# Patient Record
Sex: FEMALE | Race: OTHER | Employment: UNEMPLOYED | URBAN - NONMETROPOLITAN AREA
[De-identification: names, ages, dates, MRNs, and addresses within clinical notes are randomized per-mention and may not be internally consistent; named-entity substitution may affect disease eponyms.]

---

## 2021-08-19 ENCOUNTER — OFFICE VISIT (OUTPATIENT)
Dept: OBGYN CLINIC | Age: 41
End: 2021-08-19

## 2021-08-19 ENCOUNTER — TELEPHONE (OUTPATIENT)
Dept: OBGYN CLINIC | Age: 41
End: 2021-08-19

## 2021-08-19 VITALS
SYSTOLIC BLOOD PRESSURE: 150 MMHG | WEIGHT: 163.4 LBS | HEIGHT: 61 IN | BODY MASS INDEX: 30.85 KG/M2 | DIASTOLIC BLOOD PRESSURE: 102 MMHG

## 2021-08-19 DIAGNOSIS — O10.919: Primary | ICD-10-CM

## 2021-08-19 DIAGNOSIS — N91.1 AMENORRHEA, SECONDARY: Primary | ICD-10-CM

## 2021-08-19 PROCEDURE — 99213 OFFICE O/P EST LOW 20 MIN: CPT | Performed by: ADVANCED PRACTICE MIDWIFE

## 2021-08-19 RX ORDER — LABETALOL 200 MG/1
400 TABLET, FILM COATED ORAL 2 TIMES DAILY
COMMUNITY

## 2021-08-19 ASSESSMENT — ENCOUNTER SYMPTOMS: GASTROINTESTINAL NEGATIVE: 1

## 2021-08-19 NOTE — TELEPHONE ENCOUNTER
Please get recent ER records from [facility] - patient reported has been there twice.     Also delivery note from 4/2020 from [facility] in [facility] please

## 2021-08-19 NOTE — PROGRESS NOTES
PROBLEM VISIT     Date of service: 2021    Ethan Lombardo  Is a 42 y.o.  female    PT's PCP is: No primary care provider on file. : 1980                                          Review of Systems   Constitutional: Negative. HENT: Negative. Increased saliva   Cardiovascular: Negative. Gastrointestinal: Negative. Genitourinary: Positive for menstrual problem (LMP in May - positive HPT) and vaginal bleeding (Postcoital spotting). Neurological: Negative. \"my neck hurts sometimes\"   Psychiatric/Behavioral: Negative. Patient's last menstrual period was 2021 (exact date). OB History    Para Term  AB Living   4 4 3 1   4   SAB TAB Ectopic Molar Multiple Live Births             4      # Outcome Date GA Lbr Alex/2nd Weight Sex Delivery Anes PTL Lv   4  04/15/20 30w0d   F CS-Unspec   DARIA   3 Term 16 40w0d   F Vag-Spont   DARIA   2 Term 04 40w0d   F Vag-Spont   DARIA   1 Term 99 40w0d   F Vag-Spont   DARIA        Social History     Tobacco Use   Smoking Status Never Smoker   Smokeless Tobacco Never Used        Social History     Substance and Sexual Activity   Alcohol Use Not Currently       Allergies: Patient has no known allergies. Current Outpatient Medications:     Prenatal MV-Min-Fe Fum-FA-DHA (PRENATAL 1 PO), Take by mouth, Disp: , Rfl:     labetalol (NORMODYNE) 200 MG tablet, Take 200 mg by mouth 2 times daily, Disp: , Rfl:     Social History     Substance and Sexual Activity   Sexual Activity Not on file       Chief Complaint   Patient presents with    Amenorrhea     Pt c/o lots of salvia. Pt has previous  and incison hurts. Pt has hx high BP. Physical Exam  Constitutional:       Appearance: Normal appearance. HENT:      Head: Normocephalic. Eyes:      Pupils: Pupils are equal, round, and reactive to light. Cardiovascular:      Rate and Rhythm: Normal rate and regular rhythm.       Pulses: Normal pulses. Heart sounds: Normal heart sounds. No murmur heard. Pulmonary:      Effort: Pulmonary effort is normal.      Breath sounds: Normal breath sounds. No wheezing. Abdominal:      Palpations: Abdomen is soft. Tenderness: There is no abdominal tenderness. There is no guarding or rebound. Comments: Doppler attempted - FHT's 143   Musculoskeletal:         General: Normal range of motion. Cervical back: Normal range of motion. Neurological:      Mental Status: She is alert and oriented to person, place, and time. Skin:     General: Skin is warm and dry. Psychiatric:         Behavior: Behavior normal.   Vitals and nursing note reviewed. Chaperone present: Here with children. Vital Signs  Blood pressure (!) 150/102, height 5' 1\" (1.549 m), weight 163 lb 6.4 oz (74.1 kg), last menstrual period 05/17/2021. HPI New patient to office. Here to establish care. Has been to ER at [facility] - will request records - for HTN - prescribed Labetalol 200mg PO BID but is only taking it once daily. Taking PNV - script from ER. Last pregnancy was delivered in [facility] by c/s due to severe range BP. FOB reported to be healthy but patient does not want him involved. Assessment and Plan          Diagnosis Orders   1. Amenorrhea, secondary         Discussed timing of viability USN, PNI, NOB. Encouraged to start bASA now for HTn. Will consult cardiology due to BP control and poorly controlled CHTN. Discussed that due to [facility] will need wkly nst/bpp at 32 weeks, del at 37 weeks via repeat c/s - she understood. Will get ER records and op note from c/s in [place]. Encouraged to take another Labetalol tablet while in office. I am having Dominik Iqbal maintain her Prenatal MV-Min-Fe Fum-FA-DHA (PRENATAL 1 PO) and labetalol. No follow-ups on file.     She was also counseled on her preventative health maintenance recommendations and follow-up. There are no Patient Instructions on file for this visit.     [address] [place] [address] 12:48 PM                   Electronically signed by KOKI Jones CNM

## 2021-08-23 ENCOUNTER — TELEPHONE (OUTPATIENT)
Dept: OBGYN CLINIC | Age: 41
End: 2021-08-23

## 2021-08-23 NOTE — TELEPHONE ENCOUNTER
Reviewed ER records    Did have viability USN 7/17/21 showing viable IUP 9 1/7 weeks gestation with MAMI 2/18/2022. Fibroid 4x4.8cm  Left ovarian cyst 2.7 and 3.2cm? Repeat viability scheduled to recheck all of these measurements 8/25. Has PNI same day. No NOB is scheduled - please get this scheduled!! Shes late to care and very high risk.       Records from ER are in your scan pile

## 2021-08-25 ENCOUNTER — INITIAL PRENATAL (OUTPATIENT)
Dept: OBGYN CLINIC | Age: 41
End: 2021-08-25

## 2021-08-25 ENCOUNTER — HOSPITAL ENCOUNTER (OUTPATIENT)
Age: 41
Setting detail: SPECIMEN
Discharge: HOME | End: 2021-08-25

## 2021-08-25 ENCOUNTER — TELEPHONE (OUTPATIENT)
Dept: OBGYN | Age: 41
End: 2021-08-25

## 2021-08-25 VITALS
BODY MASS INDEX: 31.19 KG/M2 | SYSTOLIC BLOOD PRESSURE: 162 MMHG | HEIGHT: 61 IN | DIASTOLIC BLOOD PRESSURE: 102 MMHG | WEIGHT: 165.2 LBS

## 2021-08-25 DIAGNOSIS — O99.210 OBESITY IN PREGNANCY: ICD-10-CM

## 2021-08-25 DIAGNOSIS — Z34.90 PREGNANCY, UNSPECIFIED GESTATIONAL AGE: ICD-10-CM

## 2021-08-25 DIAGNOSIS — O10.919 CHRONIC HYPERTENSION IN PREGNANCY: ICD-10-CM

## 2021-08-25 DIAGNOSIS — Z34.90 PREGNANCY, UNSPECIFIED GESTATIONAL AGE: Primary | ICD-10-CM

## 2021-08-25 LAB
-: NORMAL
ABO/RH: NORMAL
ABSOLUTE EOS #: 0 K/UL (ref 0–0.4)
ABSOLUTE IMMATURE GRANULOCYTE: 0 K/UL (ref 0–0.3)
ABSOLUTE LYMPH #: 1.19 K/UL (ref 1–4.8)
ABSOLUTE MONO #: 0.59 K/UL (ref 0.1–0.8)
ALBUMIN SERPL-MCNC: 3.8 G/DL (ref 3.5–5.2)
ALBUMIN/GLOBULIN RATIO: 1.1 (ref 1–2.5)
ALP BLD-CCNC: 63 U/L (ref 35–104)
ALT SERPL-CCNC: 17 U/L (ref 5–33)
AMORPHOUS: NORMAL
AMPHETAMINE SCREEN URINE: NEGATIVE
ANION GAP SERPL CALCULATED.3IONS-SCNC: 17 MMOL/L (ref 9–17)
ANTIBODY SCREEN: NEGATIVE
AST SERPL-CCNC: 21 U/L
BACTERIA: NORMAL
BARBITURATE SCREEN URINE: NEGATIVE
BASOPHILS # BLD: 0 % (ref 0–2)
BASOPHILS ABSOLUTE: 0 K/UL (ref 0–0.2)
BENZODIAZEPINE SCREEN, URINE: NEGATIVE
BILIRUB SERPL-MCNC: <0.1 MG/DL (ref 0.3–1.2)
BILIRUBIN URINE: NEGATIVE
BUN BLDV-MCNC: 22 MG/DL (ref 6–20)
BUN/CREAT BLD: ABNORMAL (ref 9–20)
BUPRENORPHINE URINE: NORMAL
CALCIUM SERPL-MCNC: 9.8 MG/DL (ref 8.6–10.4)
CANNABINOID SCREEN URINE: NEGATIVE
CASTS UA: NORMAL /LPF (ref 0–8)
CHLORIDE BLD-SCNC: 101 MMOL/L (ref 98–107)
CO2: 19 MMOL/L (ref 20–31)
COCAINE METABOLITE, URINE: NEGATIVE
COLOR: YELLOW
COMMENT UA: ABNORMAL
CREAT SERPL-MCNC: 0.91 MG/DL (ref 0.5–0.9)
CREATININE URINE: 140.7 MG/DL (ref 28–217)
CRYSTALS, UA: NORMAL /HPF
DIFFERENTIAL TYPE: ABNORMAL
EOSINOPHILS RELATIVE PERCENT: 0 % (ref 1–4)
EPITHELIAL CELLS UA: NORMAL /HPF (ref 0–5)
GFR AFRICAN AMERICAN: >60 ML/MIN
GFR NON-AFRICAN AMERICAN: >60 ML/MIN
GFR SERPL CREATININE-BSD FRML MDRD: ABNORMAL ML/MIN/{1.73_M2}
GFR SERPL CREATININE-BSD FRML MDRD: ABNORMAL ML/MIN/{1.73_M2}
GLUCOSE BLD-MCNC: 89 MG/DL (ref 70–99)
GLUCOSE URINE: NEGATIVE
HCT VFR BLD CALC: 35.6 % (ref 36.3–47.1)
HEMOGLOBIN: 10.6 G/DL (ref 11.9–15.1)
HEPATITIS B SURFACE ANTIGEN: NONREACTIVE
HEPATITIS C ANTIBODY: NONREACTIVE
HIV AG/AB: REACTIVE
IMMATURE GRANULOCYTES: 0 %
KETONES, URINE: NEGATIVE
LEUKOCYTE ESTERASE, URINE: NEGATIVE
LYMPHOCYTES # BLD: 18 % (ref 24–44)
MCH RBC QN AUTO: 23.8 PG (ref 25.2–33.5)
MCHC RBC AUTO-ENTMCNC: 29.8 G/DL (ref 28.4–34.8)
MCV RBC AUTO: 79.8 FL (ref 82.6–102.9)
MDMA URINE: NORMAL
METHADONE SCREEN, URINE: NEGATIVE
METHAMPHETAMINE, URINE: NORMAL
MONOCYTES # BLD: 9 % (ref 1–7)
MORPHOLOGY: ABNORMAL
MORPHOLOGY: ABNORMAL
MUCUS: NORMAL
NITRITE, URINE: NEGATIVE
NRBC AUTOMATED: 0 PER 100 WBC
OPIATES, URINE: NEGATIVE
OTHER OBSERVATIONS UA: NORMAL
OXYCODONE SCREEN URINE: NEGATIVE
PDW BLD-RTO: 18.6 % (ref 11.8–14.4)
PH UA: 5 (ref 5–8)
PHENCYCLIDINE, URINE: NEGATIVE
PLATELET # BLD: ABNORMAL K/UL (ref 138–453)
PLATELET ESTIMATE: ABNORMAL
PLATELET, FLUORESCENCE: 169 K/UL (ref 138–453)
PLATELET, IMMATURE FRACTION: 7.2 % (ref 1.1–10.3)
PMV BLD AUTO: ABNORMAL FL (ref 8.1–13.5)
POTASSIUM SERPL-SCNC: 4 MMOL/L (ref 3.7–5.3)
PROPOXYPHENE, URINE: NORMAL
PROTEIN UA: ABNORMAL
RBC # BLD: 4.46 M/UL (ref 3.95–5.11)
RBC # BLD: ABNORMAL 10*6/UL
RBC UA: NORMAL /HPF (ref 0–4)
RENAL EPITHELIAL, UA: NORMAL /HPF
RUBV IGG SER QL: >500 IU/ML
SEG NEUTROPHILS: 73 % (ref 36–66)
SEGMENTED NEUTROPHILS ABSOLUTE COUNT: 4.82 K/UL (ref 1.8–7.7)
SODIUM BLD-SCNC: 137 MMOL/L (ref 135–144)
SPECIFIC GRAVITY UA: 1.02 (ref 1–1.03)
T. PALLIDUM, IGG: NONREACTIVE
TEST INFORMATION: NORMAL
TOTAL PROTEIN, URINE: 82 MG/DL
TOTAL PROTEIN: 7.3 G/DL (ref 6.4–8.3)
TRICHOMONAS: NORMAL
TRICYCLIC ANTIDEPRESSANTS, UR: NORMAL
TURBIDITY: CLEAR
URIC ACID: 5.3 MG/DL (ref 2.4–5.7)
URINE HGB: NEGATIVE
URINE TOTAL PROTEIN CREATININE RATIO: 0.58 (ref 0–0.2)
UROBILINOGEN, URINE: NORMAL
WBC # BLD: 6.6 K/UL (ref 3.5–11.3)
WBC # BLD: ABNORMAL 10*3/UL
WBC UA: NORMAL /HPF (ref 0–5)
YEAST: NORMAL

## 2021-08-25 PROCEDURE — 0500F INITIAL PRENATAL CARE VISIT: CPT | Performed by: ADVANCED PRACTICE MIDWIFE

## 2021-08-25 RX ORDER — ASPIRIN 81 MG/1
81 TABLET, CHEWABLE ORAL DAILY
COMMUNITY

## 2021-08-25 NOTE — PROGRESS NOTES
Here for PNI with daughter as  following USN. BP's elevated. Dr Moreno Pun aware and pt to increase Labetalol to 400 mg BID and recheck next week. Instructions written down for pts daughter on med increase. Baseline PIH, random glucose, uric acid, CMP and protein/creatinine done with . Unable to do early 1hr gtt d/t gestation of 14 weeks so labs drawn. Hx c/s with last pregnancy with  delivery d/t elevated BP's. Pt will return next week for NOB and BP check.

## 2021-08-25 NOTE — PATIENT INSTRUCTIONS
Patient Education        Learning About Starting to Breastfeed  Planning ahead     Before your baby is born, plan ahead. Learn all you can about breastfeeding. This helps make breastfeeding easier. · Early in your pregnancy, talk to your doctor or midwife about breastfeeding. · Learn the basics before your baby is born. The staff at hospitals and birthing centers can help you find a lactation specialist. This person is often a nurse who has been trained to teach and advise about breastfeeding. Or you can take a breastfeeding class. · Plan ahead for times when you will need help after your baby is born. You may want to get help from friends and family. You can also join a support group to talk to others who breastfeed. · Buy the equipment you'll need. Examples are breast pads, nipple cream, extra pillows, and nursing bras. Find out about breast pumps too. Getting help from your hospital or birthing center  It's important to have support from the doctors, nurses, and hospital staff who care for you and your baby. Before it's time for you to give birth, ask about the breastfeeding policies at your hospital or birthing center. Look for a hospital or birthing center that has policies for:  · \"Rooming in. \" This policy encourages you to have your baby in the room with you. It can allow you to breastfeed more often. · Supplemental feedings. Tell the staff that your baby is to get only your breast milk from birth. If staff feed your baby water, sugar solution, or formula right after birth without a medical reason, it may make it harder for you to breastfeed. · Pacifiers or artificial nipples. Staff should not give your  these items. They may interfere with breastfeeding. · Follow-up. Find out if your hospital can help you with breastfeeding issues after you go home. See if you can get information on support groups or other contacts.  They might help if you need help setting up and staying with your breastfeeding routine. Your first feeding  It's best to start breastfeeding within 1 hour of birth. For each feeding, you go through these basic steps:  · Get ready for the feeding. Be calm and relaxed, and try not to be distracted. Get some water or juice for yourself. Use two or three pillows to help support your baby while nursing. · Find a breastfeeding position that is comfortable for you and your baby. Examples are the cradle and the football positions. Make sure the baby's head and chest are lined up straight and facing your breast. It's best to switch which breast you start with each time. · Get the baby latched on well. Your baby's mouth needs to be wide open, like a yawn. So you may need to gently touch the middle of your baby's lower lip. When your baby's mouth is open wide, quickly bring the baby onto your nipple and areola. The areola is the dark [place] around your nipple. · Provide a complete feeding. Let your baby decide how long to nurse. Be sure to burp your baby after each breast.  In the first days after birth, your breasts make a thick, yellow liquid called colostrum. This liquid gives your baby nutrients and antibodies against infection. It is all that babies need at first. Your breasts will fill with milk a few days after the birth. Talk to your doctor, midwife, or lactation specialist right away if you are having problems and aren't sure what to do. How often to breastfeed  Plan to breastfeed your baby on demand rather than setting a strict schedule. For the first 2 weeks, be prepared to breastfeed at least 8 times in a 24-hour period. In the first few days, you may need to wake a sleeping baby to feed. If you breastfeed more often, it will help your breasts to produce more milk. After you go home  After you're home, don't be afraid to call your doctor, midwife, or lactation specialist with questions. That's true even if you don't know what's bothering you.  They are used to parents of newborns calling. They can help you figure out if there is a problem, and if so, how to fix it. Plan for times when you will be apart from your baby. Use a breast pump to collect breast milk ahead of time. You can store milk in the refrigerator or freezer. Then it's ready when someone else will be taking care of your baby. Experts recommend waiting about a month until breastfeeding is going well before offering a bottle. Breastfeeding is a learned skill that gets easier over time. You are more likely to succeed if you plan ahead, learn the basic techniques, and know where to get help and support. Where can you learn more? Go to https://mydecopepiceweb.Canary. org and sign in to your BabbaCo (acquired by [employer] in 2014) account. Enter D898 in the Blizuu box to learn more about \"Learning About Starting to Breastfeed. \"     If you do not have an account, please click on the \"Sign Up Now\" link. Current as of: October 8, 2020               Content Version: 12.9  © 2006-2021 SimpleOrder. Care instructions adapted under license by [facility] ([facility]). If you have questions about a medical condition or this instruction, always ask your healthcare professional. [place] [address] any warranty or liability for your use of this information. Patient Education        Nutrition During Pregnancy: Care Instructions  Your Care Instructions     Healthy eating when you are pregnant is important for you and your baby. It can help you feel well and have a successful pregnancy and delivery. During pregnancy your nutrition needs increase. Even if you have excellent eating habits, your doctor may recommend a multivitamin to make sure you get enough iron and folic acid. Many pregnant women wonder how much weight they should gain. In general, women who were at a healthy weight before they became pregnant should gain between 25 and 35 pounds.  Women who were overweight before pregnancy are usually advised to gain 15 to 25 pounds. Women who were underweight before pregnancy are usually advised to gain 28 to 40 pounds. Your doctor will work with you to set a weight goal that is right for you. Gaining a healthy amount of weight helps you have a healthy baby. Follow-up care is a key part of your treatment and safety. Be sure to make and go to all appointments, and call your doctor if you are having problems. It's also a good idea to know your test results and keep a list of the medicines you take. How can you care for yourself at home? · Eat plenty of fruits and vegetables. Include a variety of orange, yellow, and leafy dark-green vegetables every day. · Choose whole-grain bread, cereal, and pasta. Good choices include whole wheat bread, whole wheat pasta, brown rice, and oatmeal.  · Get 4 or more servings of milk and milk products each day. Good choices include nonfat or low-fat milk, yogurt, and cheese. If you cannot eat milk products, you can get calcium from calcium-fortified products such as orange juice, soy milk, and tofu. Other non-milk sources of calcium include leafy green vegetables, such as broccoli, kale, mustard greens, turnip greens, bok raudel, and brussels sprouts. · If you eat meat, pick lower-fat types. Good choices include lean cuts of meat and chicken or turkey without the skin. · Do not eat shark, swordfish, deepali mackerel, or tilefish. They have high levels of mercury, which is dangerous to your baby. You can eat up to 12 ounces a week of fish or shellfish that have low mercury levels. Good choices include shrimp, wild salmon, pollock, and catfish. Limit some other types of fish, such as white (albacore) tuna, to 4 oz (0.1 kg) a week. · Heat lunch meats (such as turkey, ham, or bologna) to 165°F before you eat them. This reduces your risk of getting sick from a kind of bacteria that can be found in lunch meats.   · Do not eat unpasteurized soft cheeses, such as brie, feta, fresh mozzarella, and blue cheese. They have a bacteria that could harm your baby. · Limit caffeine. If you drink coffee or tea, have no more than 1 cup a day. Caffeine is also found in bridget. · Do not drink any alcohol. No amount of alcohol has been found to be safe during pregnancy. · Do not diet or try to lose weight. For example, do not follow a low-carbohydrate diet. If you are overweight at the start of your pregnancy, your doctor will work with you to manage your weight gain. · Tell your doctor about all vitamins and supplements you take. When should you call for help? Watch closely for changes in your health, and be sure to contact your doctor if you have any problems. Where can you learn more? Go to https://Paraytecpepiceweb.iList. org and sign in to your eFans account. Enter Y785 in the SomaLogic box to learn more about \"Nutrition During Pregnancy: Care Instructions. \"     If you do not have an account, please click on the \"Sign Up Now\" link. Current as of: October 8, 2020               Content Version: 12.9  © 2006-2021 J C Lads. Care instructions adapted under license by [facility] ([facility]). If you have questions about a medical condition or this instruction, always ask your healthcare professional. [place] [address] any warranty or liability for your use of this information. Patient Education        Weeks 14 to 25 of Your Pregnancy: Care Instructions  Your Care Instructions     During this time, you may start to \"show,\" so that you look pregnant to people around you. You may also notice some changes in your skin, such as itchy spots on your palms or acne on your face. Your baby is now able to pass urine, and your baby's first stool (meconium) is starting to collect in his or her intestines. Hair is also beginning to grow on your baby's head.   At your next visit, between weeks 18 and 20, your doctor may do an ultrasound test. The test allows your doctor to check for certain problems. Your doctor can also tell the sex of your baby. This is a good time to think about whether you want to know whether your baby is a boy or a girl. Talk to your doctor about getting a flu shot to help keep you healthy during your pregnancy. As your pregnancy moves along, it is common to worry or feel anxious. Your body is changing a lot. And you are thinking about giving birth, the health of your baby, and becoming a parent. You can learn to cope with any anxiety and stress you feel. Follow-up care is a key part of your treatment and safety. Be sure to make and go to all appointments, and call your doctor if you are having problems. It's also a good idea to know your test results and keep a list of the medicines you take. How can you care for yourself at home? Reduce stress    · Ask for help with cooking and housekeeping.     · Figure out who or what causes your stress. Avoid these people or situations as much as possible.     · Relax every day. Taking 10- to 15-minute breaks can make a big difference. Take a walk, listen to music, or take a warm bath.     · Learn relaxation techniques at prenatal or yoga class. Or buy a relaxation tape.     · List your fears about having a baby and becoming a parent. Share the list with someone you trust. Decide which worries are really small, and try to let them go. Exercise    · If you did not exercise much before pregnancy, start slowly. Walking is best. [employer], and do a little more every day.     · Brisk walking, easy jogging, low-impact aerobics, water aerobics, and yoga are good choices. Some sports, such as scuba diving, horseback riding, downhill skiing, gymnastics, and water skiing, are not a good idea.     · Try to do at least 2½ hours a week of moderate exercise, such as a fast walk. One way to do this is to be active 30 minutes a day, at least 5 days a week.     · Wear loose clothing.  And wear shoes and a bra that provide good support.     · Warm up and cool down to start and finish your exercise.     · If you want to use weights, be sure to use light weights. They reduce stress on your joints. Stay at the best weight for you    · Experts recommend that you gain about 1 pound a month during the first 3 months of your pregnancy.     · Experts recommend that you gain about 1 pound a week during your last 6 months of pregnancy, for a total weight gain of 25 to 35 pounds.     · If you are underweight, you will need to gain more weight (about 28 to 40 pounds).     · If you are overweight, you may not need to gain as much weight (about 15 to 25 pounds).     · If you are gaining weight too fast, use common sense. Exercise every day, and limit sweets, fast foods, and fats. Choose lean meats, fruits, and vegetables.     · If you are having twins or more, your doctor may refer you to a dietitian. Where can you learn more? Go to https://SegONE Inc.pePryvskyler.healthEncore Alert. org and sign in to your Quote Roller account. Enter X412 in the Therative box to learn more about \"Weeks 14 to 18 of Your Pregnancy: Care Instructions. \"     If you do not have an account, please click on the \"Sign Up Now\" link. Current as of: October 8, 2020               Content Version: 12.9  © 2477-8260 Healthwise, Incorporated. Care instructions adapted under license by [facility] ([facility]). If you have questions about a medical condition or this instruction, always ask your healthcare professional. [place] [address] any warranty or liability for your use of this information.

## 2021-08-26 LAB
CULTURE: NO GROWTH
ESTIMATED AVERAGE GLUCOSE: 103 MG/DL
HBA1C MFR BLD: 5.2 % (ref 4–6)
HIV INTERPRETATION: ABNORMAL
HIV-1 ANTIBODY: POSITIVE
HIV-2 AB: NEGATIVE
Lab: NORMAL
SPECIMEN DESCRIPTION: NORMAL

## 2021-08-26 NOTE — TELEPHONE ENCOUNTER
Spoke with Dr. Heide Hawkins office and they moved her up to September 30th and said they would be her on the wait list and if anything earlier opens up they will call.

## 2021-08-27 LAB — FRUCTOSAMINE: 227 UMOL/L (ref 170–285)

## 2021-08-30 ENCOUNTER — TELEPHONE (OUTPATIENT)
Dept: OBGYN CLINIC | Age: 41
End: 2021-08-30

## 2021-08-30 NOTE — TELEPHONE ENCOUNTER
I received a voicemail from Dwayne nolen at [facility] Department that patient had a reactive HIV. She is questioning about follow up testing, etc.  It does not appear that [facility] has reviewed the results yet. She is requesting a return call at [phone] ext [phone] with follow up recommendations/testing being ordered.

## 2021-08-31 ENCOUNTER — TELEPHONE (OUTPATIENT)
Dept: OBGYN CLINIC | Age: 41
End: 2021-08-31

## 2021-08-31 NOTE — TELEPHONE ENCOUNTER
Dr Lew Blanco - please review    AMA, very poorly controlled HTN - was in CCU at [address] recently, started on Labetalol and meds adjusted per you when she was in for PNI. Her pr/cr ratio is 0.58 and slightly elevated BUN/Creat - would you like to consider seeing nephrology in pregnancy due to likely kidney damage also from [facility]. Also now positive HIV - possibly new dx.

## 2021-08-31 NOTE — TELEPHONE ENCOUNTER
Please call health dept with all results - HIV antibody testing was done and is resulted.   We will discuss with patient today at her visit - did not report this hx to us

## 2021-09-01 ENCOUNTER — TELEPHONE (OUTPATIENT)
Dept: OBGYN | Age: 41
End: 2021-09-01

## 2021-09-01 RX ORDER — FERROUS SULFATE 325(65) MG
325 TABLET ORAL DAILY
Qty: 30 TABLET | Refills: 6 | Status: SHIPPED | OUTPATIENT
Start: 2021-09-01

## 2021-09-01 NOTE — TELEPHONE ENCOUNTER
Thanks - I am aware needs to be referred. I noted this in message to Alf since she is seeing patient for  NOB that was rescheduled. Can you try to reach patient with results - then have Heather Ortez refer to Dr Rajni Martinez for additional testing and treatment.

## 2021-09-01 NOTE — TELEPHONE ENCOUNTER
Spoke with patient's daughter and made her aware of anemia and positive HIV status. She stated patient did not know she had HIV. Advised of additional testing needed and referral to infectious disease.

## 2021-09-01 NOTE — TELEPHONE ENCOUNTER
Looks like you are now seeing her for her NOB - she no showed yesterday. Can you make sure she is aware of cardiology appt. Also her NOB labs ws positive for HIV - had not reported this to use so need additional details to determine if new onset and refer to ID for mgmt and f/u after delivery.

## 2021-09-01 NOTE — TELEPHONE ENCOUNTER
Can you please try again to get records from hospital in [place] - I want her prenatal labs (determine if was HIV at that time) and delivery information.     Thanks

## 2021-09-01 NOTE — TELEPHONE ENCOUNTER
[facility] Department aware of results. I spoke with Ar Hernandes and she states patient needs to be referred to infectious disease ASAP and started on medication. Pt also needs additional testing done, confirmatory test. She states patient can see Dr. Deedee Garces or there is a place in [place] called [facility] that helps pregnant women who have HIV. Stephanie would like a call back with a plan for this patient.

## 2021-09-07 NOTE — TELEPHONE ENCOUNTER
She needs to consider complete transfer of care to tertiary center if she is that sick - might be able to comanage with [facility]fm to save her some driving

## 2021-09-15 ENCOUNTER — INITIAL PRENATAL (OUTPATIENT)
Dept: OBGYN CLINIC | Age: 41
End: 2021-09-15

## 2021-09-15 ENCOUNTER — HOSPITAL ENCOUNTER (OUTPATIENT)
Age: 41
Setting detail: SPECIMEN
Discharge: HOME | End: 2021-09-15

## 2021-09-15 VITALS
SYSTOLIC BLOOD PRESSURE: 180 MMHG | DIASTOLIC BLOOD PRESSURE: 110 MMHG | BODY MASS INDEX: 31.14 KG/M2 | WEIGHT: 164.8 LBS

## 2021-09-15 DIAGNOSIS — Z3A.17 17 WEEKS GESTATION OF PREGNANCY: ICD-10-CM

## 2021-09-15 DIAGNOSIS — O09.522 HIGH-RISK PREGNANCY, ELDERLY MULTIGRAVIDA IN SECOND TRIMESTER: Primary | ICD-10-CM

## 2021-09-15 PROCEDURE — 99214 OFFICE O/P EST MOD 30 MIN: CPT | Performed by: NURSE PRACTITIONER

## 2021-09-15 NOTE — PROGRESS NOTES
Varun Corona is a 42 y.o. female 17w2d    Patient was offered interpretor, declines and desires daughter to translate today in office. The patient was seen and evaluated. Fetal movement was Present. No contractions or leakage of fluid. Signs and symptoms of  labor as well as labor were reviewed. Genetic testing was not ordered and reviewed. MSAFP was not ordered for a 15-20 week gestational age window.  Reviewed. Dates were reviewed with the patient. Reports intermittent, heavy, bright red vaginal bleeding with clots for the past two weeks. Denies s/s of vaginal or urinary infection. Upon exam pap and gc/ct were collected with consent. There was no blood noted to be in the vagina. Reviewed BP readings with Dr. Barbara Kaplan will increase Labetalol 400mg TID. Reviewed readings with patient and her daughter who is translating. Stressed the importance of compliance with medication. Reports she was not previously aware of HIV infection. Daughter states Dr. Clarisse Gong office called but she did schedule appt yet because did not know their schedule. Stressed importance of getting appointment scheduled ASAP. Also, encouraged patient to return calls to health department. Reviewed HR pregnancy with multiple complicating factors and complete transfer of care to higher level of care as soon as possible. Daughter and patient understand. *Update G/P status next visit - her op notes from [facility] confirm G8 at that time, G9 now? Cardiology Appt   Needs Infectious Disease referral  **HIV Positive**    Does pt have history of infant delivery before 37 weeks: Yes  Previous c/s: Yes  Prenatal testing:  Ped: Unsure  Blood type:  Rhogam:   Flu shot:   TDAP (27-36 wks):  GBS:  GTT:  GC/CT:  30 week Growth:  MRSA: Denies        Assessment:  1. Varun Corona is a 42 y.o. female  2. [id number]  3. 17w2d    There are no problems to display for this patient. Diagnosis Orders   1.  High-risk pregnancy, elderly multigravida in second trimester  PAP SMEAR    Chlamydia/GC DNA, Thin Prep   2. 17 weeks gestation of pregnancy           Plan:  Return for ultrasound, bleeding. There are no Patient Instructions on file for this visit.

## 2021-09-16 ENCOUNTER — TELEPHONE (OUTPATIENT)
Dept: OBGYN CLINIC | Age: 41
End: 2021-09-16

## 2021-09-16 ENCOUNTER — TELEPHONE (OUTPATIENT)
Dept: OBGYN | Age: 41
End: 2021-09-16

## 2021-09-16 ENCOUNTER — TELEPHONE (OUTPATIENT)
Dept: PERINATAL CARE | Age: 41
End: 2021-09-16

## 2021-09-16 DIAGNOSIS — Z21 MATERNAL HIV POSITIVE COMPLICATING PREGNANCY IN SECOND TRIMESTER, ANTEPARTUM (HCC): ICD-10-CM

## 2021-09-16 DIAGNOSIS — O98.712 MATERNAL HIV POSITIVE COMPLICATING PREGNANCY IN SECOND TRIMESTER, ANTEPARTUM (HCC): ICD-10-CM

## 2021-09-16 DIAGNOSIS — O09.522 HIGH-RISK PREGNANCY, ELDERLY MULTIGRAVIDA IN SECOND TRIMESTER: ICD-10-CM

## 2021-09-16 DIAGNOSIS — O10.912 CHRONIC HYPERTENSION WITH EXACERBATION DURING PREGNANCY IN SECOND TRIMESTER: Primary | ICD-10-CM

## 2021-09-16 NOTE — TELEPHONE ENCOUNTER
Spoke with Lucille with the [address] regarding transfer of care. Beverly King will speak with [address] [place] [address] as soon as possible. They will contact patient with appointment information.

## 2021-09-16 NOTE — TELEPHONE ENCOUNTER
Can you please watch to see when patient is scheduled for [facility] clinic as we increased BP medication and if she is not going to be seen there we will need to schedule her here for a BP check.

## 2021-09-16 NOTE — TELEPHONE ENCOUNTER
Pt was seen in office yesterday 9/15/21 and DAWSON Coronado explained need for transfer of care. Referral sent to  for complete transfer of care.

## 2021-09-16 NOTE — TELEPHONE ENCOUNTER
Rosie Crite with [facility] [address] [facility] called to see if patient had an appointment yet with infectious disease Dr. Surjit Hickman I informed Syliva Gowers that they had gotten a call from Dr. Corbin Cox but did not schedule d/t their schedule. Rosie requested that I call the patient and reinforce the importance of this appointment in order to protect the baby. I called pt's daughter and informed her of this. I gave Dr. West Shafer phone number to her. She is to call them today and set up an appointment. When they are back in our office this afternoon they are to inform us as to when the appointment with Dr. Corbin Cox is scheduled and we then need to call Valdemar Yuen back at the Health Dept ([phone]) and inform her.

## 2021-09-17 LAB
CHLAMYDIA BY THIN PREP: NEGATIVE
N. GONORRHOEAE DNA, THIN PREP: NEGATIVE
PHYSICIAN ID COMMENT: NORMAL
PHYSICIAN: NORMAL
SPECIMEN DESCRIPTION: NORMAL
ZZ NTE CLEAN UP: ORDERED TEST: NORMAL
ZZ NTE WITH NAME CLEAN UP: SPECIMEN SOURCE: NORMAL

## 2021-09-17 NOTE — TELEPHONE ENCOUNTER
Spoke with Shira Hampton at [place] [address] and told her that patient has no Insurance or Transportation to get to [place].

## 2021-09-20 ENCOUNTER — TELEPHONE (OUTPATIENT)
Dept: OBGYN CLINIC | Age: 41
End: 2021-09-20

## 2021-09-21 ENCOUNTER — TELEPHONE (OUTPATIENT)
Dept: OBGYN CLINIC | Age: 41
End: 2021-09-21

## 2021-09-21 NOTE — TELEPHONE ENCOUNTER
I spoke with Dr Joe Galvin this morning via phone regarding patient. We need to bring her into office this week with a provider to check BP and discuss care. We want to discuss her high risk nature, timing of delivery at [facility] - needs to be 36 weeks otherwise will need to be transferred to [place] via Ambulance or [facility] for care. Stress importance of adequate care and BP control, medication dosing and frequency. Consider addition of Procardia to Labetalol and make sure taking bASA daily. Consider more frequent visits for monitoring and stress importance of f/u with infectious disease and with cardiology as referrals are done. Please reach out to patient and her daughter and get them an appt this week! Also an appt next week with provider.   Thanks

## 2021-09-21 NOTE — TELEPHONE ENCOUNTER
Jewel Harris spoke to patient's daughter, she called Dr. Jami Lema office and there was confusion on why she needs to be seen, so nothing was scheduled.

## 2021-09-21 NOTE — TELEPHONE ENCOUNTER
Left message for patients daughter ( due to language barrier) on 9-20 @ 4:15 and again today 9-21 @ 8 am to call our office to schedule an appt. I stressed the importance of scheduling due to the complications of patients health. Daughter did call back and spoke with Ade who then transferred to a nurse so they could explain how important this care is for mother and baby.

## 2021-09-22 NOTE — TELEPHONE ENCOUNTER
Solitario monae spoke with pts daughter and explained why the referral was made to Dr Watson [facility] office. She told the daughter to call their office back and schedule appointment due to patients HIV status.

## 2021-09-23 ENCOUNTER — ROUTINE PRENATAL (OUTPATIENT)
Dept: OBGYN CLINIC | Age: 41
End: 2021-09-23

## 2021-09-23 VITALS — BODY MASS INDEX: 32.12 KG/M2 | DIASTOLIC BLOOD PRESSURE: 120 MMHG | WEIGHT: 170 LBS | SYSTOLIC BLOOD PRESSURE: 204 MMHG

## 2021-09-23 DIAGNOSIS — O10.912 CHRONIC HYPERTENSION WITH EXACERBATION DURING PREGNANCY IN SECOND TRIMESTER: ICD-10-CM

## 2021-09-23 DIAGNOSIS — O98.712 MATERNAL HIV POSITIVE COMPLICATING PREGNANCY IN SECOND TRIMESTER, ANTEPARTUM (HCC): Primary | ICD-10-CM

## 2021-09-23 DIAGNOSIS — Z21 MATERNAL HIV POSITIVE COMPLICATING PREGNANCY IN SECOND TRIMESTER, ANTEPARTUM (HCC): Primary | ICD-10-CM

## 2021-09-23 DIAGNOSIS — O09.522 HIGH-RISK PREGNANCY, ELDERLY MULTIGRAVIDA IN SECOND TRIMESTER: ICD-10-CM

## 2021-09-23 DIAGNOSIS — Z3A.18 18 WEEKS GESTATION OF PREGNANCY: ICD-10-CM

## 2021-09-23 PROCEDURE — 99214 OFFICE O/P EST MOD 30 MIN: CPT | Performed by: NURSE PRACTITIONER

## 2021-09-23 RX ORDER — NIFEDIPINE 30 MG/1
30 TABLET, EXTENDED RELEASE ORAL DAILY
Qty: 30 TABLET | Refills: 0 | Status: SHIPPED | OUTPATIENT
Start: 2021-09-23

## 2021-09-23 NOTE — TELEPHONE ENCOUNTER
I called Dr. Jovany Torrez office. Pt had already called to schedule and has appointment on Oct. 18th.

## 2021-09-23 NOTE — PROGRESS NOTES
Pt presents with her daughter for BP check. Taking labetalol  400 mg TID. States increase does not make her feel good. Has appt w/ Dr Santa Singleton on 10/18. GFM.

## 2021-09-24 LAB — CYTOLOGY REPORT: NORMAL

## 2021-09-24 NOTE — PROGRESS NOTES
Lew Owens is a 42 y.o. female 18w3d      The patient was seen and evaluated. Fetal movement was Present. No contractions or leakage of fluid. Signs and symptoms of  labor as well as labor were reviewed.  Reviewed. Dates were reviewed with the patient. An 18-22 week anatomy ultrasound has been ordered. The patient will return to the office for her next visit in 1 weeks. See antepartum flow sheet. This visit was conducted with the assistance of  Rick Limon. Patient denies any further vaginal bleeding and abdominal pain. She noted some white vaginal discharge for several hours which then resolved. Denies itching, burning or irritation. Reviewed importance of follow up appointments with cardiology and infectious disease (new diagnosis of HIV) both are scheduled at this time. Discussed HR nature of pregnancy- advance maternal age, uncontrolled hypertension- plan to add secondary HTN medication (Dr. Kaylah Norwood amendable to POC, which was previously discussed at [address] [place] [address]) and HIV infection. Encouraged MARIANGEL to higher level of care facility within [facility] as she will likely need transferred upon arrival in [place] due to HR pregnancy. Patient is agreeable- recommendations made. She previously refused [place] as she does not have reliable vehicle.  [employer] #[id number] was used to discussed severe range blood pressure readings with mild headache while in the office. Discussed need for immediate emergency medical attention and my recommendation to transfer to ER by emergency medical vehicle. We discussed potential adverse outcomes including but not limited to stroke, heart attack and death. On call physician Dr. Amelia Puri was consulted regarding care of patient. Reviewed severe range BP readings. He is amendable to plan of care and has no further recommendations at this time. Patient verbalized understanding but does not have .  Would like to sign AMA paperwork, this paperwork was explained to patient and she signed with a witness. Document scanned to Media. *Update G/P status next visit - her op notes from [facility] confirm G8 at that time, G9 now? Cardiology Appt 9/30  Needs Infectious Disease referral  **HIV Positive**    Does pt have history of infant delivery before 37 weeks: Yes  Previous c/s: Yes  Prenatal testing:  Ped: Unsure  Blood type:  Rhogam:   Flu shot:   TDAP (27-36 wks):  GBS:  GTT:  GC/CT:  30 week Growth:  MRSA: Denies        Assessment:  1. Peter Hernandez is a 42 y.o. female  2. [id number]  3. 18w3d    There are no problems to display for this patient. Diagnosis Orders   1. Maternal HIV positive complicating pregnancy in second trimester, antepartum ([address]     2. High-risk pregnancy, elderly multigravida in second trimester     3. 18 weeks gestation of pregnancy     4. Chronic hypertension with exacerbation during pregnancy in second trimester           Plan:  No follow-ups on file. There are no Patient Instructions on file for this visit. Over 50% of time spent on counseling and care coordination on: see assessment and plan,  She was also counseled on her preventative health maintenance recommendations and follow-up.         FF time: 45 min      KOKI Edge NP,9/23/2021 9:28 PM

## 2021-09-27 ENCOUNTER — TELEPHONE (OUTPATIENT)
Dept: OBGYN CLINIC | Age: 41
End: 2021-09-27

## 2021-09-27 NOTE — TELEPHONE ENCOUNTER
Vita Mott    I discussed this patient in detail with Dr Salud Maldonado and Michaela Sahu. I know she signed records release but unsure if any further plans for her care are established. Please discharge per Dr Salud Maldonado from practice for non compliance due to high risk nature.     Thanks

## 2021-10-01 LAB
HPV SAMPLE: NORMAL
HPV, GENOTYPE 16: NOT DETECTED
HPV, GENOTYPE 18: NOT DETECTED
HPV, HIGH RISK OTHER: NOT DETECTED
HPV, INTERPRETATION: NORMAL
SPECIMEN DESCRIPTION: NORMAL

## 2021-10-11 ENCOUNTER — ROUTINE PRENATAL (OUTPATIENT)
Dept: OBGYN CLINIC | Age: 41
End: 2021-10-11

## 2021-10-11 VITALS
BODY MASS INDEX: 32.73 KG/M2 | DIASTOLIC BLOOD PRESSURE: 100 MMHG | WEIGHT: 173.2 LBS | SYSTOLIC BLOOD PRESSURE: 200 MMHG

## 2021-10-11 DIAGNOSIS — Z91.14 POOR COMPLIANCE WITH MEDICATION: ICD-10-CM

## 2021-10-11 DIAGNOSIS — Z91.199 POOR COMPLIANCE: ICD-10-CM

## 2021-10-11 DIAGNOSIS — O09.219 PREVIOUS PRETERM DELIVERY, ANTEPARTUM: ICD-10-CM

## 2021-10-11 DIAGNOSIS — H53.9 VISION CHANGES: ICD-10-CM

## 2021-10-11 DIAGNOSIS — O09.522 MULTIGRAVIDA OF ADVANCED MATERNAL AGE IN SECOND TRIMESTER: ICD-10-CM

## 2021-10-11 DIAGNOSIS — Z21 HIV POSITIVE (HCC): ICD-10-CM

## 2021-10-11 DIAGNOSIS — O09.92 HIGH-RISK PREGNANCY IN SECOND TRIMESTER: Primary | ICD-10-CM

## 2021-10-11 DIAGNOSIS — I10 SEVERE UNCONTROLLED HYPERTENSION: ICD-10-CM

## 2021-10-11 DIAGNOSIS — Z98.891 PREVIOUS CESAREAN SECTION: ICD-10-CM

## 2021-10-11 DIAGNOSIS — Z3A.21 21 WEEKS GESTATION OF PREGNANCY: ICD-10-CM

## 2021-10-11 DIAGNOSIS — R51.9 NONINTRACTABLE HEADACHE, UNSPECIFIED CHRONICITY PATTERN, UNSPECIFIED HEADACHE TYPE: ICD-10-CM

## 2021-10-11 PROCEDURE — 99214 OFFICE O/P EST MOD 30 MIN: CPT | Performed by: ADVANCED PRACTICE MIDWIFE

## 2021-10-11 RX ORDER — NIFEDIPINE 60 MG/1
60 TABLET, EXTENDED RELEASE ORAL DAILY
Qty: 30 TABLET | Refills: 0 | Status: SHIPPED | OUTPATIENT
Start: 2021-10-11

## 2021-10-11 RX ORDER — LABETALOL 200 MG/1
TABLET, FILM COATED ORAL
Qty: 60 TABLET | Refills: 0 | Status: SHIPPED | OUTPATIENT
Start: 2021-10-11

## 2021-10-11 NOTE — LETTER
[facility]  [address]  [address] [address]  Phone: [phone]  Fax: [phone]    Patient: Catalino Nguyen  YOB: 1980  Date: 10/11/2021 Time: 4:50 PM    Leaving the [facility] Advice    Chart #: [id number]    This will certify that I, the undersigned,    ______________________________________________________________________    A patient in the above named medical center, having requested discharge and removal from the medical center against the advice of my attending physician(s), hereby release the Emergency Department, its physicians, officers and employees, severally and individually, from any and all liability of any nature whatsoever for any injury or harm or complication of any kind that may result directly or indirectly, by reason of my terminating my stay as a patient from [facility], and hereby waive any and all rights of action I may now have or later acquire as a result of my voluntary departure from [facility] and the termination of my stay as a patient therein. This release is made with the full knowledge of the danger that may result from the action which I am taking.       Date:_______________________                         ___________________________                                                                                    Patient/Legal Representative    Witness:        ____________________________                          ___________________________  Nurse                                                                        Physician

## 2021-10-11 NOTE — PROGRESS NOTES
Angelica Jhaveri is a 42 y.o. female 21w0d    Initially visit was conducted with  Ruiz Montemayor #[id number] but due to connectivity concerns we could not continue visit - no information was provided only introduction of all parties involved. Reconnection was done with a second  to assist with visit Luther Mendoza #[id number]. Patients daugthers were also present for examination    The patient was seen and evaluated. There was positive fetal movements. No contractions or leakage of fluid. Signs and symptoms of  labor as well as labor were reviewed. The patients anatomy ultrasound has been completed and reviewed with patient. The S/S of Pre-Eclampsia were reviewed with the patient in detail. She is to report any of these if they occur. She currently complains of headache since yesterday. Reports started last evening, took bASA and had some relief but returned today. Reports that was crying today before visit due to transportation issues which made headache return. Has NOT had blood pressure medications since yesterday - reports that both ran out. At last visit was Rx Procardia XL 30mg daily - adament that these are gone but still should have ample supply. Was also encouraged at a prior visit to take Labetalol 400mg PO TID but reported that \"made me not feel good\" therefore was only taking Labetalol 400mg PO BID. Denies dyspnea/chest pain. Requesting medication refills. I inquired about appointments with cardiology and infectious disease. Reports that missed cardiology appt due to transportation issues and ID appointment is scheduled for next week - strongly encouraged to keep the appointment due to newly diagnosed with HIV. I encouraged patient to go to hospital for admission and mgmt of severe range blood pressures, accompanied by headache and blurry vision - she declines multiple times.   She reported that recently started a new job and cannot miss work as \"I will lose my job\".  I inquired about where she is working as we can help with this - patient unsure of employer name/location and her daugther was also unsure of either. Denies chest pain, denies dyspnea, reflexes to bilateral lower extremities are 2+ no clonus present. Pupils were evaluated and found to be round, equal, reactive to light. Patient reports Bo Sevilla had this for 18 years and always worse when I am pregnant\". Discussed risks of uncontrolled HTN including but not limited to stroke, PE, death, placental abruption, fetal death. Reports has home BP cuff - has not written down any readings. Also reports bloody noses x2 weeks in mornings and intermittently through the night. Would like to have permanent contraception method - aware that we do not perform this at [facility].    Again strongly encouraged patient to present to ER - locally [facility] - for eval and management of blood pressures, patient declines, consulted DR Angelo Monday. Will send script for additional month of medications - increase Procardia to 60mg daily, Labetalol 400mg PO TID. Patient signed form against medical advice today and also agreeable to discharge from practice due to non compliance, high risk nature. Stressed importance of patient seeking care at another OB/Gyn office due to her high risk care - information given for [facility] OB/Gyn offices as they can also handle delivery of an earlier infant 32 weeks + whereas [address] [place] [address] requires 36 weeks+. AMA form and discharge from practice forms signed, copies given to patient, left office with daugther    The patient is RH positive Rhogam Ordered no    The patient was instructed on fetal kick counts and was encouraged to monitor every 8 hours. This is to begin at 28 weeks gestation.  She was instructed that the baby should move at a minimum of ten times within one hour after a meal. The patient was instructed to lay down on her left side twenty minutes after eating and count movements for up to one hour with a target value of ten movements. She was instructed to notify the office if she did not make that target after two attempts or if after any attempt there was less than four movements. Cardiology Appt 9/30  Needs Infectious Disease referral  **HIV Positive**    Does pt have history of infant delivery before 37 weeks: Yes  Previous c/s: Yes  Prenatal testing:  Ped: Unsure  Blood type:  Rhogam:   Flu shot:   TDAP (27-36 wks):  GBS:  GTT:  GC/CT:  30 week Growth:  MRSA: Denies      Assessment:  1Yudi Romeo is a 42 y.o. female  2. [id number]  3. 21w0d    There are no problems to display for this patient. Diagnosis Orders   1. High-risk pregnancy in second trimester     2. 21 weeks gestation of pregnancy           Plan:  The patient will be transfer of care to [facility] OB[facility] and only return for urgent matters for 30 days from today    Visit billed by time with patient - regarding treatment, additional consults, additional referrals, HR nature discussion, plans for discharge from practice and leaving AMA.   Spent 39 minutes with patient

## 2021-10-12 PROBLEM — Z98.891 PREVIOUS CESAREAN SECTION: Status: ACTIVE | Noted: 2021-10-12

## 2021-10-12 PROBLEM — O09.219 PREVIOUS PRETERM DELIVERY, ANTEPARTUM: Status: ACTIVE | Noted: 2021-10-12

## 2021-10-12 PROBLEM — Z91.14 POOR COMPLIANCE WITH MEDICATION: Status: ACTIVE | Noted: 2021-10-12

## 2021-10-12 PROBLEM — O09.522 MULTIGRAVIDA OF ADVANCED MATERNAL AGE IN SECOND TRIMESTER: Status: ACTIVE | Noted: 2021-10-12

## 2021-10-12 PROBLEM — I10 SEVERE UNCONTROLLED HYPERTENSION: Status: ACTIVE | Noted: 2021-10-12

## 2021-10-12 PROBLEM — Z21 HIV POSITIVE (HCC): Status: ACTIVE | Noted: 2021-10-12

## 2025-05-29 NOTE — TELEPHONE ENCOUNTER
Left another Vm for pt records from [facility] [place]. Also faxed records request to [phone]. Patient Information from Today's Visit    The members of your [facility] are listed below:    Physician Provider: Dr. Marlon Brantley   Advanced Practice Clinician: Margot Simmons   Registered Nurse: Jennifer SMALL  Nurse Navigator: N/A  Medical Assistant: Manju LEMONS  : Margot \"Paz\" NITESH   Supportive Care Services: Júnior NAVARRO AMANDEEPW    Diagnosis (Information Sheet Provided on Day of Diagnosis):     --New Patient: Iron Deficiency Anemia      Follow Up Instructions:     -Reviewed past medical history.   -You will need to have additional labs drawn today.   -We will send in a prescription for Vitamin D to our pharmacy downstairs in our clinic. After you finish taking this prescription, you can start taking an over the counter vitamin D supplement.   -We will recheck your labs and follow up 06/18.               Has Treatment Plan Been Finalized? N/A     Current Labs: N/A      Please refer to After Visit Summary or [employer] for upcoming appointment information. Please call our office for rescheduling needs at least 24 hours before your scheduled appointment time.If you have any questions regarding your upcoming schedule please reach out to your care team through [employer] or call [phone].     Please notify your assigned Nurse Navigator of any unplanned hospital admissions or Emergency Room visits within 24 hours of discharge.    -------------------------------------------------------------------------------------------------------------------  Please call our office at [phone] if you have any  of the following symptoms:   Fever of 100.5 or greater  Chills  Shortness of breath  Swelling or pain in one leg    After office hours an answering service is available and will contact a provider for emergencies or if you are experiencing any of the above symptoms.        JENNIFER OLMOS RN